# Patient Record
Sex: MALE | Race: WHITE | NOT HISPANIC OR LATINO | Employment: STUDENT | URBAN - METROPOLITAN AREA
[De-identification: names, ages, dates, MRNs, and addresses within clinical notes are randomized per-mention and may not be internally consistent; named-entity substitution may affect disease eponyms.]

---
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
## 2022-07-04 ENCOUNTER — HOSPITAL ENCOUNTER (EMERGENCY)
Facility: CLINIC | Age: 22
Discharge: HOME | End: 2022-07-05
Attending: EMERGENCY MEDICINE | Admitting: EMERGENCY MEDICINE
Payer: COMMERCIAL

## 2022-07-04 DIAGNOSIS — R10.13 ABDOMINAL PAIN, EPIGASTRIC: ICD-10-CM

## 2022-07-04 LAB
BASOPHILS # BLD AUTO: 0 10E3/UL (ref 0–0.2)
BASOPHILS NFR BLD AUTO: 1 %
EOSINOPHIL # BLD AUTO: 0.2 10E3/UL (ref 0–0.7)
EOSINOPHIL NFR BLD AUTO: 2 %
ERYTHROCYTE [DISTWIDTH] IN BLOOD BY AUTOMATED COUNT: 11.9 % (ref 10–15)
HCT VFR BLD AUTO: 48.3 % (ref 40–53)
HGB BLD-MCNC: 16.9 G/DL (ref 13.3–17.7)
IMM GRANULOCYTES # BLD: 0 10E3/UL
IMM GRANULOCYTES NFR BLD: 0 %
LYMPHOCYTES # BLD AUTO: 2.8 10E3/UL (ref 0.8–5.3)
LYMPHOCYTES NFR BLD AUTO: 32 %
MCH RBC QN AUTO: 31.2 PG (ref 26.5–33)
MCHC RBC AUTO-ENTMCNC: 35 G/DL (ref 31.5–36.5)
MCV RBC AUTO: 89 FL (ref 78–100)
MONOCYTES # BLD AUTO: 0.5 10E3/UL (ref 0–1.3)
MONOCYTES NFR BLD AUTO: 6 %
NEUTROPHILS # BLD AUTO: 5.2 10E3/UL (ref 1.6–8.3)
NEUTROPHILS NFR BLD AUTO: 59 %
NRBC # BLD AUTO: 0 10E3/UL
NRBC BLD AUTO-RTO: 0 /100
PLATELET # BLD AUTO: 255 10E3/UL (ref 150–450)
RBC # BLD AUTO: 5.42 10E6/UL (ref 4.4–5.9)
WBC # BLD AUTO: 8.8 10E3/UL (ref 4–11)

## 2022-07-04 PROCEDURE — 99284 EMERGENCY DEPT VISIT MOD MDM: CPT | Mod: 25

## 2022-07-04 PROCEDURE — 36415 COLL VENOUS BLD VENIPUNCTURE: CPT | Performed by: EMERGENCY MEDICINE

## 2022-07-04 PROCEDURE — 85025 COMPLETE CBC W/AUTO DIFF WBC: CPT | Performed by: EMERGENCY MEDICINE

## 2022-07-04 PROCEDURE — 93005 ELECTROCARDIOGRAM TRACING: CPT

## 2022-07-04 ASSESSMENT — ENCOUNTER SYMPTOMS
VOMITING: 1
ABDOMINAL PAIN: 1
BACK PAIN: 1
DIFFICULTY URINATING: 0
FEVER: 0
NAUSEA: 1
COUGH: 0

## 2022-07-05 VITALS
TEMPERATURE: 98.7 F | WEIGHT: 128.2 LBS | RESPIRATION RATE: 16 BRPM | DIASTOLIC BLOOD PRESSURE: 86 MMHG | HEIGHT: 69 IN | HEART RATE: 63 BPM | BODY MASS INDEX: 18.99 KG/M2 | SYSTOLIC BLOOD PRESSURE: 129 MMHG | OXYGEN SATURATION: 98 %

## 2022-07-05 LAB
ALBUMIN SERPL-MCNC: 4.3 G/DL (ref 3.4–5)
ALP SERPL-CCNC: 91 U/L (ref 40–150)
ALT SERPL W P-5'-P-CCNC: 15 U/L (ref 0–70)
ANION GAP SERPL CALCULATED.3IONS-SCNC: 7 MMOL/L (ref 3–14)
AST SERPL W P-5'-P-CCNC: 11 U/L (ref 0–45)
ATRIAL RATE - MUSE: 59 BPM
BILIRUB SERPL-MCNC: 0.6 MG/DL (ref 0.2–1.3)
BUN SERPL-MCNC: 11 MG/DL (ref 7–30)
CALCIUM SERPL-MCNC: 9.1 MG/DL (ref 8.5–10.1)
CHLORIDE BLD-SCNC: 105 MMOL/L (ref 94–109)
CO2 SERPL-SCNC: 28 MMOL/L (ref 20–32)
CREAT SERPL-MCNC: 0.67 MG/DL (ref 0.66–1.25)
DIASTOLIC BLOOD PRESSURE - MUSE: NORMAL MMHG
GFR SERPL CREATININE-BSD FRML MDRD: >90 ML/MIN/1.73M2
GLUCOSE BLD-MCNC: 124 MG/DL (ref 70–99)
INTERPRETATION ECG - MUSE: NORMAL
LIPASE SERPL-CCNC: 129 U/L (ref 73–393)
P AXIS - MUSE: 77 DEGREES
POTASSIUM BLD-SCNC: 3.7 MMOL/L (ref 3.4–5.3)
PR INTERVAL - MUSE: 146 MS
PROT SERPL-MCNC: 7.2 G/DL (ref 6.8–8.8)
QRS DURATION - MUSE: 94 MS
QT - MUSE: 400 MS
QTC - MUSE: 396 MS
R AXIS - MUSE: 80 DEGREES
SODIUM SERPL-SCNC: 140 MMOL/L (ref 133–144)
SYSTOLIC BLOOD PRESSURE - MUSE: NORMAL MMHG
T AXIS - MUSE: 76 DEGREES
TROPONIN I SERPL HS-MCNC: <3 NG/L
VENTRICULAR RATE- MUSE: 59 BPM

## 2022-07-05 PROCEDURE — 80053 COMPREHEN METABOLIC PANEL: CPT | Performed by: EMERGENCY MEDICINE

## 2022-07-05 PROCEDURE — 36415 COLL VENOUS BLD VENIPUNCTURE: CPT | Performed by: EMERGENCY MEDICINE

## 2022-07-05 PROCEDURE — 83690 ASSAY OF LIPASE: CPT | Performed by: EMERGENCY MEDICINE

## 2022-07-05 PROCEDURE — 250N000009 HC RX 250: Performed by: EMERGENCY MEDICINE

## 2022-07-05 PROCEDURE — 250N000013 HC RX MED GY IP 250 OP 250 PS 637: Performed by: EMERGENCY MEDICINE

## 2022-07-05 PROCEDURE — 84484 ASSAY OF TROPONIN QUANT: CPT | Performed by: EMERGENCY MEDICINE

## 2022-07-05 PROCEDURE — 250N000011 HC RX IP 250 OP 636: Performed by: EMERGENCY MEDICINE

## 2022-07-05 PROCEDURE — 96374 THER/PROPH/DIAG INJ IV PUSH: CPT

## 2022-07-05 RX ADMIN — FAMOTIDINE 20 MG: 10 INJECTION, SOLUTION INTRAVENOUS at 01:15

## 2022-07-05 RX ADMIN — LIDOCAINE HYDROCHLORIDE 30 ML: 20 SOLUTION ORAL; TOPICAL at 00:07

## 2022-07-05 NOTE — DISCHARGE INSTRUCTIONS
Take antacid medication like omeprazole, pantoprazole or pepcid which are over the counter for next few days  Avoid spicy foods, ibuprofen, aspirin as these can be upsetting to your stomach    Watch for fever, vomiting, increase in abdominal pain or pain moving to right lower quadrant

## 2022-07-05 NOTE — ED PROVIDER NOTES
"  History   Chief Complaint:  Chest pain and abdominal pain     The history is provided by the patient.      Henry Carroll is a 22 year old male with history of depression who presents with chest pain, back pain, and abdominal pain that started 2 days ago. He was prescribed doxycycline for a rash and last took it 2 days ago. He was prescribed to take it for 10 days but quit after 5 or 6 days because his rash had disappeared. He states his pain is in the center of his chest and upper left abdominal quadrant and describes it as a \"burning\" and \"clenching\" feeling. His stomach pain is exacerbated by eating and relieved upon applying pressure. He had some nausea. He denies that he is experiencing shortness of breath, fever, difficulty urinating, or coughing. He states that he pain radiates to his back after laying down. The patient states that this has never happened before. He denies any history of surgery, medical problems, or stomach issues. The patient states he has a history of smoking vapes and marijuana but has quit. He rarely drinks alcohol. The patient denies taking any daily medications.    Review of Systems   Constitutional: Negative for fever.   Respiratory: Negative for cough.    Cardiovascular: Positive for chest pain.   Gastrointestinal: Positive for abdominal pain, nausea and vomiting.   Genitourinary: Negative for difficulty urinating.   Musculoskeletal: Positive for back pain.   All other systems reviewed and are negative.    Allergies:  Bupropion    Medications:  Atarax  Adderall  Sertraline    Past Medical History:     Depression    Past Surgical History:    None    Social History:  The patient presents to the ED with his father. He arrived to the ED via car.    Physical Exam     Patient Vitals for the past 24 hrs:   BP Temp Temp src Pulse Resp SpO2 Height Weight   07/05/22 0139 -- -- -- -- -- 97 % -- --   07/05/22 0130 129/86 -- -- 63 -- 97 % -- --   07/04/22 2236 122/75 98.7  F (37.1  C) Oral 76 " "16 100 % 1.753 m (5' 9\") 58.2 kg (128 lb 3.2 oz)       Physical Exam  General: Sitting up in bed  Eyes:  The pupils are equal and round    Conjunctivae and sclerae are normal  ENT:    Wearing a mask  Neck:  Normal range of motion  CV:  Regular rate, regular rhythm     Skin warm and well perfused   Resp:  Non labored breathing on room air    No tachypnea    No cough heard    Lungs clear bilaterally  GI:  Abdomen is soft, there is no rigidity    No distension    No rebound tenderness     Minimal epigastric tenderness, LUQ tenderness  MS:  Normal muscular tone  Skin:  No rash or acute skin lesions noted  Neuro:   Awake, alert.      Speech is normal and fluent.    Face is symmetric.     Moves all extremities equally  Psych: Normal affect.  Appropriate interactions.    Emergency Department Course   ECG  ECG taken at 2358, ECG read at 2359  Sinus bradycardia with sinus arrhythmia  Early repolarization   Rate 59 bpm. MS interval 146 ms. QRS duration 94 ms. QT/QTc 400/396 ms. P-R-T axes 77 80 76.     Laboratory:  Labs Ordered and Resulted from Time of ED Arrival to Time of ED Departure   COMPREHENSIVE METABOLIC PANEL - Abnormal       Result Value    Sodium 140      Potassium 3.7      Chloride 105      Carbon Dioxide (CO2) 28      Anion Gap 7      Urea Nitrogen 11      Creatinine 0.67      Calcium 9.1      Glucose 124 (*)     Alkaline Phosphatase 91      AST 11      ALT 15      Protein Total 7.2      Albumin 4.3      Bilirubin Total 0.6      GFR Estimate >90     LIPASE - Normal    Lipase 129     TROPONIN I - Normal    Troponin I High Sensitivity <3     CBC WITH PLATELETS AND DIFFERENTIAL    WBC Count 8.8      RBC Count 5.42      Hemoglobin 16.9      Hematocrit 48.3      MCV 89      MCH 31.2      MCHC 35.0      RDW 11.9      Platelet Count 255      % Neutrophils 59      % Lymphocytes 32      % Monocytes 6      % Eosinophils 2      % Basophils 1      % Immature Granulocytes 0      NRBCs per 100 WBC 0      Absolute Neutrophils " 5.2      Absolute Lymphocytes 2.8      Absolute Monocytes 0.5      Absolute Eosinophils 0.2      Absolute Basophils 0.0      Absolute Immature Granulocytes 0.0      Absolute NRBCs 0.0        Emergency Department Course:     Reviewed:  I reviewed nursing notes, vitals, past medical history and Care Everywhere    Assessments:  2338 I obtained history and examined the patient as noted above.   0218 I rechecked the patient and explained findings.     Interventions:  0007 Lidocaine 2% 15 mL, aluminum and magnesium hydroxide simethicone 15 mL: 30 mL PO  0115 Famotidine 20 mg IV    Disposition:  The patient was discharged to home.     Impression & Plan     Medical Decision Making:  Henry Carroll is a 22-year-old male who presented to the emergency department with abdominal pain.  His pain seems to be in the epigastric and left upper quadrant on exam.  It is very minimal on palpation.  He looks well.  His laboratory studies are unremarkable.  He felt improvement with a GI cocktail.  I doubt cardiac cause of symptoms.  No symptoms to suggest PE. Doubt dissection. I repeated exam and reevaluated him and he felt improved.  I do not think he needs imaging of his abdomen such as ultrasound of gallbladder or CT abdomen pelvis.  I doubt appendicitis especially since he has no right lower quadrant tenderness on exam.  I do suspect that the doxycycline may have contributed to his presentation today.  His rash is now gone and no indication for him to complete the doxycycline course.  I recommended taking a PPI or H2 blocker over the next few days.  Should avoid NSAIdS, aspirin, spicy food.  Reasons to return to the emergency department were discussed with the patient including vomiting, fever, pain moving to the right lower quadrant or pain worsening to the point that he cannot manage it at home.    Diagnosis:    ICD-10-CM    1. Abdominal pain, epigastric  R10.13      Scribe Disclosure:  I, Cali Paet, am serving as a scribe at  11:38 PM on 7/4/2022 to document services personally performed by Cecelia Storm MD based on my observations and the provider's statements to me.        Cecelia Storm MD  07/05/22 0631

## 2022-07-05 NOTE — ED TRIAGE NOTES
Pt on Doxycycycline 2 weeks ago for infection .  Stopped taking it early then came back.  Happened to take 1 of the left over doxycycline Saturday.  Noted LUQ Abd pain radiating into back, nausea, heartburn and epigastric pain.  Of note, pt also consumed alcohol on Friday evening.  None of these sx occurred during the time he was taking Doxy at first.  Dad does not know p[t uses alcohol     Triage Assessment     Row Name 07/04/22 8575       Triage Assessment (Adult)    Airway WDL WDL       Respiratory WDL    Respiratory WDL WDL       Skin Circulation/Temperature WDL    Skin Circulation/Temperature WDL WDL       Cardiac WDL    Cardiac WDL WDL       Peripheral/Neurovascular WDL    Peripheral Neurovascular WDL WDL       Cognitive/Neuro/Behavioral WDL    Cognitive/Neuro/Behavioral WDL WDL